# Patient Record
Sex: FEMALE | Race: BLACK OR AFRICAN AMERICAN | URBAN - METROPOLITAN AREA
[De-identification: names, ages, dates, MRNs, and addresses within clinical notes are randomized per-mention and may not be internally consistent; named-entity substitution may affect disease eponyms.]

---

## 2020-08-26 ENCOUNTER — TELEPHONE ENCOUNTER (OUTPATIENT)
Dept: URBAN - METROPOLITAN AREA CLINIC 54 | Facility: CLINIC | Age: 55
End: 2020-08-26

## 2020-09-21 ENCOUNTER — OFFICE VISIT (OUTPATIENT)
Dept: URBAN - METROPOLITAN AREA CLINIC 54 | Facility: CLINIC | Age: 55
End: 2020-09-21
Payer: MEDICARE

## 2020-09-21 DIAGNOSIS — R19.7 DIARRHEA: ICD-10-CM

## 2020-09-21 DIAGNOSIS — K21.0 REFLUX ESOPHAGITIS: ICD-10-CM

## 2020-09-21 DIAGNOSIS — K31.84 GASTROPARESIS: ICD-10-CM

## 2020-09-21 DIAGNOSIS — D12.6 SERRATED ADENOMA OF COLON: ICD-10-CM

## 2020-09-21 PROCEDURE — 99214 OFFICE O/P EST MOD 30 MIN: CPT | Performed by: INTERNAL MEDICINE

## 2020-09-21 RX ORDER — CLONIDINE HYDROCHLORIDE 0.2 MG/1
AS DIRECTED TABLET ORAL
Refills: 0 | Status: ACTIVE | COMMUNITY
Start: 1900-01-01

## 2020-09-21 RX ORDER — TOPIRAMATE 25 MG/1
1 TABLET TABLET, FILM COATED ORAL TWICE A DAY
Refills: 0 | Status: ACTIVE | COMMUNITY
Start: 1900-01-01

## 2020-09-21 RX ORDER — PREGABALIN 150 MG/1
1 CAPSULE CAPSULE ORAL TWICE A DAY
Refills: 0 | Status: ACTIVE | COMMUNITY
Start: 1900-01-01

## 2020-09-21 RX ORDER — ATORVASTATIN CALCIUM 80 MG/1
TABLET, FILM COATED ORAL
Qty: 0 | Refills: 0 | Status: ACTIVE | COMMUNITY
Start: 1900-01-01

## 2020-09-21 RX ORDER — OXYCODONE AND ACETAMINOPHEN 5; 325 MG/1; MG/1
1 TABLET AS NEEDED TABLET ORAL
Status: ACTIVE | COMMUNITY

## 2020-09-21 RX ORDER — FLUTICASONE PROPIONATE 50 UG/1
1 SPRAY IN EACH NOSTRIL SPRAY, METERED NASAL ONCE A DAY
Status: ACTIVE | COMMUNITY

## 2020-09-21 RX ORDER — AMLODIPINE BESYLATE 10 MG/1
TAKE 1 TABLET (10 MG) BY ORAL ROUTE ONCE DAILY TABLET ORAL 1
Qty: 0 | Refills: 0 | Status: ACTIVE | COMMUNITY
Start: 1900-01-01

## 2020-09-21 RX ORDER — BUTORPHANOL TARTRATE 10 MG/ML
SPRAY NASAL
Qty: 0 | Refills: 0 | Status: DISCONTINUED | COMMUNITY
Start: 1900-01-01

## 2020-09-21 RX ORDER — EZETIMIBE 10 MG/1
TAKE 1 TABLET (10 MG) BY ORAL ROUTE ONCE DAILY TABLET ORAL 1
Qty: 0 | Refills: 0 | Status: ACTIVE | COMMUNITY
Start: 1900-01-01

## 2020-09-21 RX ORDER — METHOTREXATE SODIUM 2.5 MG/1
AS DIRECTED TABLET ORAL
Refills: 0 | Status: ACTIVE | COMMUNITY
Start: 1900-01-01

## 2020-09-21 RX ORDER — DICLOFENAC SODIUM 10 MG/G
AS DIRECTED GEL TOPICAL
Status: ACTIVE | COMMUNITY

## 2020-09-21 RX ORDER — OMEPRAZOLE 20 MG/1
1 CAPSULE 30 MINUTES BEFORE MORNING MEAL CAPSULE, DELAYED RELEASE ORAL ONCE A DAY
Status: ACTIVE | COMMUNITY

## 2020-09-21 RX ORDER — LORATADINE 10 MG
1 TABLET TABLET ORAL ONCE A DAY
Status: ACTIVE | COMMUNITY

## 2020-09-21 RX ORDER — LINACLOTIDE 290 UG/1
TAKE ONE CAPSULE BY MOUTH ONCE DAILY CAPSULE, GELATIN COATED ORAL
Qty: 30 | Refills: 9 | Status: ACTIVE | COMMUNITY
Start: 2018-10-07

## 2020-09-21 RX ORDER — CHOLECALCIFEROL (VITAMIN D3) 1250 MCG
1 CAPSULE CAPSULE ORAL
Status: ACTIVE | COMMUNITY

## 2020-09-21 RX ORDER — TIZANIDINE 4 MG/1
1 TABLET AS NEEDED TABLET ORAL THREE TIMES A DAY
Status: ACTIVE | COMMUNITY

## 2020-09-21 RX ORDER — CELECOXIB 200 MG/1
1 CAPSULE WITH FOOD CAPSULE ORAL ONCE A DAY
Status: ACTIVE | COMMUNITY

## 2020-09-21 RX ORDER — SODIUM, POTASSIUM,MAG SULFATES 17.5-3.13G
354ML SOLUTION, RECONSTITUTED, ORAL ORAL
Qty: 354 MILLILITER | Refills: 0 | OUTPATIENT
Start: 2020-09-21 | End: 2020-09-22

## 2020-09-21 RX ORDER — FOLIC ACID 1 MG/1
TAKE 1 TABLET (1 MG) BY ORAL ROUTE ONCE DAILY TABLET ORAL 1
Qty: 0 | Refills: 0 | Status: ACTIVE | COMMUNITY
Start: 1900-01-01

## 2020-09-21 NOTE — HPI-TODAY'S VISIT:
Sessile polys  Gastroparesis  Pt report that she is having trouble with alternating diarrhea and abdominal pain.  Pt reports that the diarrhea has been present since Decm

## 2020-09-30 ENCOUNTER — OFFICE VISIT (OUTPATIENT)
Dept: URBAN - METROPOLITAN AREA SURGERY CENTER 14 | Facility: SURGERY CENTER | Age: 55
End: 2020-09-30
Payer: MEDICARE

## 2020-09-30 ENCOUNTER — CLAIMS CREATED FROM THE CLAIM WINDOW (OUTPATIENT)
Dept: URBAN - METROPOLITAN AREA CLINIC 4 | Facility: CLINIC | Age: 55
End: 2020-09-30
Payer: MEDICARE

## 2020-09-30 DIAGNOSIS — D12.5 BENIGN NEOPLASM OF SIGMOID COLON: ICD-10-CM

## 2020-09-30 DIAGNOSIS — K63.89 OTHER SPECIFIED DISEASES OF INTESTINE: ICD-10-CM

## 2020-09-30 DIAGNOSIS — K63.5 BENIGN COLON POLYP: ICD-10-CM

## 2020-09-30 DIAGNOSIS — R19.4 ALTERED BOWEL HABITS: ICD-10-CM

## 2020-09-30 PROCEDURE — 45380 COLONOSCOPY AND BIOPSY: CPT | Performed by: INTERNAL MEDICINE

## 2020-09-30 PROCEDURE — 88305 TISSUE EXAM BY PATHOLOGIST: CPT | Performed by: PATHOLOGY

## 2020-09-30 PROCEDURE — G9937 DIG OR SURV COLSCO: HCPCS | Performed by: INTERNAL MEDICINE

## 2020-09-30 PROCEDURE — G8907 PT DOC NO EVENTS ON DISCHARG: HCPCS | Performed by: INTERNAL MEDICINE

## 2020-09-30 PROCEDURE — 88313 SPECIAL STAINS GROUP 2: CPT | Performed by: PATHOLOGY

## 2020-09-30 PROCEDURE — 88342 IMHCHEM/IMCYTCHM 1ST ANTB: CPT | Performed by: PATHOLOGY

## 2020-09-30 RX ORDER — PREGABALIN 150 MG/1
1 CAPSULE CAPSULE ORAL TWICE A DAY
Refills: 0 | Status: ACTIVE | COMMUNITY
Start: 1900-01-01

## 2020-09-30 RX ORDER — DICLOFENAC SODIUM 10 MG/G
AS DIRECTED GEL TOPICAL
Status: ACTIVE | COMMUNITY

## 2020-09-30 RX ORDER — ATORVASTATIN CALCIUM 80 MG/1
TABLET, FILM COATED ORAL
Qty: 0 | Refills: 0 | Status: ACTIVE | COMMUNITY
Start: 1900-01-01

## 2020-09-30 RX ORDER — LINACLOTIDE 290 UG/1
TAKE ONE CAPSULE BY MOUTH ONCE DAILY CAPSULE, GELATIN COATED ORAL
Qty: 30 | Refills: 9 | Status: ACTIVE | COMMUNITY
Start: 2018-10-07

## 2020-09-30 RX ORDER — CHOLECALCIFEROL (VITAMIN D3) 1250 MCG
1 CAPSULE CAPSULE ORAL
Status: ACTIVE | COMMUNITY

## 2020-09-30 RX ORDER — OXYCODONE AND ACETAMINOPHEN 5; 325 MG/1; MG/1
1 TABLET AS NEEDED TABLET ORAL
Status: ACTIVE | COMMUNITY

## 2020-09-30 RX ORDER — CELECOXIB 200 MG/1
1 CAPSULE WITH FOOD CAPSULE ORAL ONCE A DAY
Status: ACTIVE | COMMUNITY

## 2020-09-30 RX ORDER — TIZANIDINE 4 MG/1
1 TABLET AS NEEDED TABLET ORAL THREE TIMES A DAY
Status: ACTIVE | COMMUNITY

## 2020-09-30 RX ORDER — OMEPRAZOLE 20 MG/1
1 CAPSULE 30 MINUTES BEFORE MORNING MEAL CAPSULE, DELAYED RELEASE ORAL ONCE A DAY
Status: ACTIVE | COMMUNITY

## 2020-09-30 RX ORDER — FLUTICASONE PROPIONATE 50 UG/1
1 SPRAY IN EACH NOSTRIL SPRAY, METERED NASAL ONCE A DAY
Status: ACTIVE | COMMUNITY

## 2020-09-30 RX ORDER — AMLODIPINE BESYLATE 10 MG/1
TAKE 1 TABLET (10 MG) BY ORAL ROUTE ONCE DAILY TABLET ORAL 1
Qty: 0 | Refills: 0 | Status: ACTIVE | COMMUNITY
Start: 1900-01-01

## 2020-09-30 RX ORDER — CLONIDINE HYDROCHLORIDE 0.2 MG/1
AS DIRECTED TABLET ORAL
Refills: 0 | Status: ACTIVE | COMMUNITY
Start: 1900-01-01

## 2020-09-30 RX ORDER — FOLIC ACID 1 MG/1
TAKE 1 TABLET (1 MG) BY ORAL ROUTE ONCE DAILY TABLET ORAL 1
Qty: 0 | Refills: 0 | Status: ACTIVE | COMMUNITY
Start: 1900-01-01

## 2020-09-30 RX ORDER — EZETIMIBE 10 MG/1
TAKE 1 TABLET (10 MG) BY ORAL ROUTE ONCE DAILY TABLET ORAL 1
Qty: 0 | Refills: 0 | Status: ACTIVE | COMMUNITY
Start: 1900-01-01

## 2020-09-30 RX ORDER — DICYCLOMINE HYDROCHLORIDE 20 MG/1
TAKE 1 TABLET BY MOUTH THREE TIMES DAILY AS NEEDED FOR 30 DAYS TABLET ORAL THREE TIMES A DAY
Qty: 90 | Refills: 2 | OUTPATIENT
Start: 2020-09-30 | End: 2021-06-27

## 2020-09-30 RX ORDER — METHOTREXATE SODIUM 2.5 MG/1
AS DIRECTED TABLET ORAL
Refills: 0 | Status: ACTIVE | COMMUNITY
Start: 1900-01-01

## 2020-09-30 RX ORDER — LORATADINE 10 MG
1 TABLET TABLET ORAL ONCE A DAY
Status: ACTIVE | COMMUNITY

## 2020-09-30 RX ORDER — TOPIRAMATE 25 MG/1
1 TABLET TABLET, FILM COATED ORAL TWICE A DAY
Refills: 0 | Status: ACTIVE | COMMUNITY
Start: 1900-01-01

## 2020-10-22 ENCOUNTER — TELEPHONE ENCOUNTER (OUTPATIENT)
Dept: URBAN - METROPOLITAN AREA CLINIC 54 | Facility: CLINIC | Age: 55
End: 2020-10-22

## 2020-11-23 ENCOUNTER — OFFICE VISIT (OUTPATIENT)
Dept: URBAN - METROPOLITAN AREA CLINIC 54 | Facility: CLINIC | Age: 55
End: 2020-11-23
Payer: MEDICARE

## 2020-11-23 DIAGNOSIS — K58.9 IBS (IRRITABLE BOWEL SYNDROME): ICD-10-CM

## 2020-11-23 DIAGNOSIS — D50.9 IDA (IRON DEFICIENCY ANEMIA): ICD-10-CM

## 2020-11-23 DIAGNOSIS — K31.84 GASTROPARESIS: ICD-10-CM

## 2020-11-23 PROCEDURE — G9903 PT SCRN TBCO ID AS NON USER: HCPCS | Performed by: INTERNAL MEDICINE

## 2020-11-23 PROCEDURE — G8417 CALC BMI ABV UP PARAM F/U: HCPCS | Performed by: INTERNAL MEDICINE

## 2020-11-23 PROCEDURE — G8482 FLU IMMUNIZE ORDER/ADMIN: HCPCS | Performed by: INTERNAL MEDICINE

## 2020-11-23 PROCEDURE — 1036F TOBACCO NON-USER: CPT | Performed by: INTERNAL MEDICINE

## 2020-11-23 PROCEDURE — 99214 OFFICE O/P EST MOD 30 MIN: CPT | Performed by: INTERNAL MEDICINE

## 2020-11-23 PROCEDURE — G8427 DOCREV CUR MEDS BY ELIG CLIN: HCPCS | Performed by: INTERNAL MEDICINE

## 2020-11-23 RX ORDER — LINACLOTIDE 290 UG/1
TAKE ONE CAPSULE BY MOUTH ONCE DAILY CAPSULE, GELATIN COATED ORAL
Qty: 30 | Refills: 9 | Status: ACTIVE | COMMUNITY
Start: 2018-10-07

## 2020-11-23 RX ORDER — CELECOXIB 200 MG/1
1 CAPSULE WITH FOOD CAPSULE ORAL ONCE A DAY
Status: ACTIVE | COMMUNITY

## 2020-11-23 RX ORDER — TIZANIDINE 4 MG/1
1 TABLET AS NEEDED TABLET ORAL THREE TIMES A DAY
Status: ACTIVE | COMMUNITY

## 2020-11-23 RX ORDER — OMEPRAZOLE 20 MG/1
1 CAPSULE 30 MINUTES BEFORE MORNING MEAL CAPSULE, DELAYED RELEASE ORAL ONCE A DAY
Status: ACTIVE | COMMUNITY

## 2020-11-23 RX ORDER — FOLIC ACID 1 MG/1
TAKE 1 TABLET (1 MG) BY ORAL ROUTE ONCE DAILY TABLET ORAL 1
Qty: 0 | Refills: 0 | Status: ACTIVE | COMMUNITY
Start: 1900-01-01

## 2020-11-23 RX ORDER — EZETIMIBE 10 MG/1
TAKE 1 TABLET (10 MG) BY ORAL ROUTE ONCE DAILY TABLET ORAL 1
Qty: 0 | Refills: 0 | Status: ACTIVE | COMMUNITY
Start: 1900-01-01

## 2020-11-23 RX ORDER — AMLODIPINE BESYLATE 10 MG/1
TAKE 1 TABLET (10 MG) BY ORAL ROUTE ONCE DAILY TABLET ORAL 1
Qty: 0 | Refills: 0 | Status: ACTIVE | COMMUNITY
Start: 1900-01-01

## 2020-11-23 RX ORDER — CLONIDINE HYDROCHLORIDE 0.2 MG/1
AS DIRECTED TABLET ORAL
Refills: 0 | Status: ACTIVE | COMMUNITY
Start: 1900-01-01

## 2020-11-23 RX ORDER — LORATADINE 10 MG
1 TABLET TABLET ORAL ONCE A DAY
Status: ACTIVE | COMMUNITY

## 2020-11-23 RX ORDER — OXYCODONE AND ACETAMINOPHEN 5; 325 MG/1; MG/1
1 TABLET AS NEEDED TABLET ORAL
Status: ACTIVE | COMMUNITY

## 2020-11-23 RX ORDER — METHOTREXATE SODIUM 2.5 MG/1
AS DIRECTED TABLET ORAL
Refills: 0 | Status: ACTIVE | COMMUNITY
Start: 1900-01-01

## 2020-11-23 RX ORDER — ATORVASTATIN CALCIUM 80 MG/1
TABLET, FILM COATED ORAL
Qty: 0 | Refills: 0 | Status: ACTIVE | COMMUNITY
Start: 1900-01-01

## 2020-11-23 RX ORDER — TOPIRAMATE 25 MG/1
1 TABLET TABLET, FILM COATED ORAL TWICE A DAY
Refills: 0 | Status: ACTIVE | COMMUNITY
Start: 1900-01-01

## 2020-11-23 RX ORDER — CHOLECALCIFEROL (VITAMIN D3) 1250 MCG
1 CAPSULE CAPSULE ORAL
Status: ACTIVE | COMMUNITY

## 2020-11-23 RX ORDER — PREGABALIN 150 MG/1
1 CAPSULE CAPSULE ORAL TWICE A DAY
Refills: 0 | Status: ACTIVE | COMMUNITY
Start: 1900-01-01

## 2020-11-23 RX ORDER — FLUTICASONE PROPIONATE 50 UG/1
1 SPRAY IN EACH NOSTRIL SPRAY, METERED NASAL ONCE A DAY
Status: ACTIVE | COMMUNITY

## 2020-11-23 RX ORDER — DICYCLOMINE HYDROCHLORIDE 20 MG/1
TAKE 1 TABLET BY MOUTH THREE TIMES DAILY AS NEEDED FOR 30 DAYS TABLET ORAL THREE TIMES A DAY
Qty: 90 | Refills: 2 | Status: ACTIVE | COMMUNITY
Start: 2020-09-30 | End: 2021-06-27

## 2020-11-23 RX ORDER — DICLOFENAC SODIUM 10 MG/G
AS DIRECTED GEL TOPICAL
Status: ACTIVE | COMMUNITY

## 2020-11-23 NOTE — HPI-OTHER HISTORIES
Here to review her C scope results. Had C scope with random colon bx with neg bx for microscopic colitis. Pt is currently on the FODMAP diet.  Pt reports that she had been advised that she has Sjogrens syndrome. Pt reports that she was advised that this could contribute to symptoms. Pt with hx of ankylosing spondylitis.  Pt reports that she was started on Humira and has been on injections every other week for this.  Pt reports that she is having approx q3 days a bowel movement to every day.  Pt with microcytic anemia mcv of 78 with hgb of 12

## 2020-11-30 ENCOUNTER — LAB OUTSIDE AN ENCOUNTER (OUTPATIENT)
Dept: URBAN - METROPOLITAN AREA CLINIC 54 | Facility: CLINIC | Age: 55
End: 2020-11-30

## 2020-12-30 ENCOUNTER — OFFICE VISIT (OUTPATIENT)
Dept: URBAN - METROPOLITAN AREA SURGERY CENTER 14 | Facility: SURGERY CENTER | Age: 55
End: 2020-12-30
Payer: MEDICARE

## 2020-12-30 ENCOUNTER — CLAIMS CREATED FROM THE CLAIM WINDOW (OUTPATIENT)
Dept: URBAN - METROPOLITAN AREA CLINIC 4 | Facility: CLINIC | Age: 55
End: 2020-12-30
Payer: MEDICARE

## 2020-12-30 DIAGNOSIS — K31.89 ACQUIRED DEFORMITY OF DUODENUM: ICD-10-CM

## 2020-12-30 DIAGNOSIS — K31.89 OTHER DISEASES OF STOMACH AND DUODENUM: ICD-10-CM

## 2020-12-30 DIAGNOSIS — K22.8 COLUMNAR-LINED ESOPHAGUS: ICD-10-CM

## 2020-12-30 DIAGNOSIS — D50.9 ANEMIA, IRON DEFICIENCY: ICD-10-CM

## 2020-12-30 PROCEDURE — 43239 EGD BIOPSY SINGLE/MULTIPLE: CPT | Performed by: INTERNAL MEDICINE

## 2020-12-30 PROCEDURE — 88312 SPECIAL STAINS GROUP 1: CPT | Performed by: PATHOLOGY

## 2020-12-30 PROCEDURE — 88305 TISSUE EXAM BY PATHOLOGIST: CPT | Performed by: PATHOLOGY

## 2020-12-30 PROCEDURE — G8907 PT DOC NO EVENTS ON DISCHARG: HCPCS | Performed by: INTERNAL MEDICINE

## 2020-12-30 RX ORDER — CELECOXIB 200 MG/1
1 CAPSULE WITH FOOD CAPSULE ORAL ONCE A DAY
Status: ACTIVE | COMMUNITY

## 2020-12-30 RX ORDER — TIZANIDINE 4 MG/1
1 TABLET AS NEEDED TABLET ORAL THREE TIMES A DAY
Status: ACTIVE | COMMUNITY

## 2020-12-30 RX ORDER — TOPIRAMATE 25 MG/1
1 TABLET TABLET, FILM COATED ORAL TWICE A DAY
Refills: 0 | Status: ACTIVE | COMMUNITY
Start: 1900-01-01

## 2020-12-30 RX ORDER — AMLODIPINE BESYLATE 10 MG/1
TAKE 1 TABLET (10 MG) BY ORAL ROUTE ONCE DAILY TABLET ORAL 1
Qty: 0 | Refills: 0 | Status: ACTIVE | COMMUNITY
Start: 1900-01-01

## 2020-12-30 RX ORDER — EZETIMIBE 10 MG/1
TAKE 1 TABLET (10 MG) BY ORAL ROUTE ONCE DAILY TABLET ORAL 1
Qty: 0 | Refills: 0 | Status: ACTIVE | COMMUNITY
Start: 1900-01-01

## 2020-12-30 RX ORDER — DICYCLOMINE HYDROCHLORIDE 20 MG/1
TAKE 1 TABLET BY MOUTH THREE TIMES DAILY AS NEEDED FOR 30 DAYS TABLET ORAL THREE TIMES A DAY
Qty: 90 | Refills: 2 | Status: ACTIVE | COMMUNITY
Start: 2020-09-30 | End: 2021-06-27

## 2020-12-30 RX ORDER — CHOLECALCIFEROL (VITAMIN D3) 1250 MCG
1 CAPSULE CAPSULE ORAL
Status: ACTIVE | COMMUNITY

## 2020-12-30 RX ORDER — ATORVASTATIN CALCIUM 80 MG/1
TABLET, FILM COATED ORAL
Qty: 0 | Refills: 0 | Status: ACTIVE | COMMUNITY
Start: 1900-01-01

## 2020-12-30 RX ORDER — LINACLOTIDE 290 UG/1
TAKE ONE CAPSULE BY MOUTH ONCE DAILY CAPSULE, GELATIN COATED ORAL
Qty: 30 | Refills: 9 | Status: ACTIVE | COMMUNITY
Start: 2018-10-07

## 2020-12-30 RX ORDER — CLONIDINE HYDROCHLORIDE 0.2 MG/1
AS DIRECTED TABLET ORAL
Refills: 0 | Status: ACTIVE | COMMUNITY
Start: 1900-01-01

## 2020-12-30 RX ORDER — PREGABALIN 150 MG/1
1 CAPSULE CAPSULE ORAL TWICE A DAY
Refills: 0 | Status: ACTIVE | COMMUNITY
Start: 1900-01-01

## 2020-12-30 RX ORDER — OMEPRAZOLE 20 MG/1
1 CAPSULE 30 MINUTES BEFORE MORNING MEAL CAPSULE, DELAYED RELEASE ORAL ONCE A DAY
Status: ACTIVE | COMMUNITY

## 2020-12-30 RX ORDER — OXYCODONE AND ACETAMINOPHEN 5; 325 MG/1; MG/1
1 TABLET AS NEEDED TABLET ORAL
Status: ACTIVE | COMMUNITY

## 2020-12-30 RX ORDER — FLUTICASONE PROPIONATE 50 UG/1
1 SPRAY IN EACH NOSTRIL SPRAY, METERED NASAL ONCE A DAY
Status: ACTIVE | COMMUNITY

## 2020-12-30 RX ORDER — LORATADINE 10 MG
1 TABLET TABLET ORAL ONCE A DAY
Status: ACTIVE | COMMUNITY

## 2020-12-30 RX ORDER — METHOTREXATE SODIUM 2.5 MG/1
AS DIRECTED TABLET ORAL
Refills: 0 | Status: ACTIVE | COMMUNITY
Start: 1900-01-01

## 2020-12-30 RX ORDER — FOLIC ACID 1 MG/1
TAKE 1 TABLET (1 MG) BY ORAL ROUTE ONCE DAILY TABLET ORAL 1
Qty: 0 | Refills: 0 | Status: ACTIVE | COMMUNITY
Start: 1900-01-01

## 2020-12-30 RX ORDER — DICLOFENAC SODIUM 10 MG/G
AS DIRECTED GEL TOPICAL
Status: ACTIVE | COMMUNITY

## 2022-01-25 ENCOUNTER — WEB ENCOUNTER (OUTPATIENT)
Dept: URBAN - NONMETROPOLITAN AREA CLINIC 4 | Facility: CLINIC | Age: 57
End: 2022-01-25

## 2022-01-25 ENCOUNTER — OFFICE VISIT (OUTPATIENT)
Dept: URBAN - NONMETROPOLITAN AREA CLINIC 4 | Facility: CLINIC | Age: 57
End: 2022-01-25
Payer: MEDICARE

## 2022-01-25 VITALS
TEMPERATURE: 97.7 F | WEIGHT: 180.2 LBS | SYSTOLIC BLOOD PRESSURE: 146 MMHG | HEART RATE: 87 BPM | HEIGHT: 63 IN | BODY MASS INDEX: 31.93 KG/M2 | DIASTOLIC BLOOD PRESSURE: 98 MMHG

## 2022-01-25 DIAGNOSIS — D12.6 SERRATED ADENOMA OF COLON: ICD-10-CM

## 2022-01-25 DIAGNOSIS — K58.9 IBS (IRRITABLE BOWEL SYNDROME): ICD-10-CM

## 2022-01-25 PROCEDURE — 99213 OFFICE O/P EST LOW 20 MIN: CPT | Performed by: INTERNAL MEDICINE

## 2022-01-25 RX ORDER — FOLIC ACID 1 MG/1
TAKE 1 TABLET (1 MG) BY ORAL ROUTE ONCE DAILY TABLET ORAL 1
Qty: 0 | Refills: 0 | Status: ACTIVE | COMMUNITY
Start: 1900-01-01

## 2022-01-25 RX ORDER — CLONIDINE HYDROCHLORIDE 0.2 MG/1
AS DIRECTED TABLET ORAL
Refills: 0 | Status: ACTIVE | COMMUNITY
Start: 1900-01-01

## 2022-01-25 RX ORDER — DICLOFENAC SODIUM 10 MG/G
AS DIRECTED GEL TOPICAL
Status: ACTIVE | COMMUNITY

## 2022-01-25 RX ORDER — CELECOXIB 200 MG/1
1 CAPSULE WITH FOOD CAPSULE ORAL ONCE A DAY
Status: ACTIVE | COMMUNITY

## 2022-01-25 RX ORDER — AMLODIPINE BESYLATE 10 MG/1
TAKE 1 TABLET (10 MG) BY ORAL ROUTE ONCE DAILY TABLET ORAL 1
Qty: 0 | Refills: 0 | Status: ACTIVE | COMMUNITY
Start: 1900-01-01

## 2022-01-25 RX ORDER — LINACLOTIDE 290 UG/1
TAKE ONE CAPSULE BY MOUTH ONCE DAILY CAPSULE, GELATIN COATED ORAL
Qty: 90 | Refills: 2

## 2022-01-25 RX ORDER — PREGABALIN 150 MG/1
1 CAPSULE CAPSULE ORAL TWICE A DAY
Refills: 0 | Status: ACTIVE | COMMUNITY
Start: 1900-01-01

## 2022-01-25 RX ORDER — FLUTICASONE PROPIONATE 50 UG/1
1 SPRAY IN EACH NOSTRIL SPRAY, METERED NASAL ONCE A DAY
Status: ACTIVE | COMMUNITY

## 2022-01-25 RX ORDER — TIZANIDINE 4 MG/1
1 TABLET AS NEEDED TABLET ORAL THREE TIMES A DAY
Status: ACTIVE | COMMUNITY

## 2022-01-25 RX ORDER — EZETIMIBE 10 MG/1
TAKE 1 TABLET (10 MG) BY ORAL ROUTE ONCE DAILY TABLET ORAL 1
Qty: 0 | Refills: 0 | Status: ACTIVE | COMMUNITY
Start: 1900-01-01

## 2022-01-25 RX ORDER — LINACLOTIDE 290 UG/1
TAKE ONE CAPSULE BY MOUTH ONCE DAILY CAPSULE, GELATIN COATED ORAL
Qty: 30 | Refills: 9 | Status: ACTIVE | COMMUNITY
Start: 2018-10-07

## 2022-01-25 RX ORDER — OMEPRAZOLE 20 MG/1
1 CAPSULE 30 MINUTES BEFORE MORNING MEAL CAPSULE, DELAYED RELEASE ORAL ONCE A DAY
Status: ACTIVE | COMMUNITY

## 2022-01-25 RX ORDER — TOPIRAMATE 25 MG/1
1 TABLET TABLET, FILM COATED ORAL TWICE A DAY
Refills: 0 | Status: ACTIVE | COMMUNITY
Start: 1900-01-01

## 2022-01-25 RX ORDER — ATORVASTATIN CALCIUM 80 MG/1
TABLET, FILM COATED ORAL
Qty: 0 | Refills: 0 | Status: ACTIVE | COMMUNITY
Start: 1900-01-01

## 2022-01-25 RX ORDER — OXYCODONE AND ACETAMINOPHEN 5; 325 MG/1; MG/1
1 TABLET AS NEEDED TABLET ORAL
Status: ACTIVE | COMMUNITY

## 2022-01-25 RX ORDER — CHOLECALCIFEROL (VITAMIN D3) 1250 MCG
1 CAPSULE CAPSULE ORAL
Status: ACTIVE | COMMUNITY

## 2022-01-25 RX ORDER — METHOTREXATE SODIUM 2.5 MG/1
AS DIRECTED TABLET ORAL
Refills: 0 | Status: ACTIVE | COMMUNITY
Start: 1900-01-01

## 2022-01-25 RX ORDER — LORATADINE 10 MG
1 TABLET TABLET ORAL ONCE A DAY
Status: ACTIVE | COMMUNITY

## 2022-01-25 NOTE — HPI-TODAY'S VISIT:
Patient reports that she was out of refills for her linzess.  Pt reports that she has good response to Linzess.     Pt reports that she has otherwise been in her usual state of health. Has to do a stress test on next Friday.   Pt report that she has had surgery to get the scar tissue out of eyes.   Pt reports that she has had improvement in her anemia

## 2022-11-14 ENCOUNTER — TELEPHONE ENCOUNTER (OUTPATIENT)
Dept: URBAN - NONMETROPOLITAN AREA CLINIC 4 | Facility: CLINIC | Age: 57
End: 2022-11-14

## 2022-12-13 ENCOUNTER — TELEPHONE ENCOUNTER (OUTPATIENT)
Dept: URBAN - NONMETROPOLITAN AREA CLINIC 4 | Facility: CLINIC | Age: 57
End: 2022-12-13

## 2022-12-15 ENCOUNTER — OFFICE VISIT (OUTPATIENT)
Dept: URBAN - NONMETROPOLITAN AREA CLINIC 4 | Facility: CLINIC | Age: 57
End: 2022-12-15
Payer: MEDICARE

## 2022-12-15 ENCOUNTER — LAB OUTSIDE AN ENCOUNTER (OUTPATIENT)
Dept: URBAN - NONMETROPOLITAN AREA CLINIC 4 | Facility: CLINIC | Age: 57
End: 2022-12-15

## 2022-12-15 VITALS
BODY MASS INDEX: 31.54 KG/M2 | HEART RATE: 84 BPM | HEIGHT: 63 IN | SYSTOLIC BLOOD PRESSURE: 146 MMHG | TEMPERATURE: 97.9 F | DIASTOLIC BLOOD PRESSURE: 88 MMHG | WEIGHT: 178 LBS

## 2022-12-15 DIAGNOSIS — R19.7 DIARRHEA: ICD-10-CM

## 2022-12-15 DIAGNOSIS — K58.9 IBS (IRRITABLE BOWEL SYNDROME): ICD-10-CM

## 2022-12-15 DIAGNOSIS — K31.84 GASTROPARESIS: ICD-10-CM

## 2022-12-15 DIAGNOSIS — R13.19 ESOPHAGEAL DYSPHAGIA: ICD-10-CM

## 2022-12-15 PROBLEM — 10743008 IRRITABLE BOWEL SYNDROME: Status: ACTIVE | Noted: 2020-11-23

## 2022-12-15 PROBLEM — 62315008 DIARRHEA: Status: ACTIVE | Noted: 2022-12-15

## 2022-12-15 PROCEDURE — 99215 OFFICE O/P EST HI 40 MIN: CPT | Performed by: PHYSICIAN ASSISTANT

## 2022-12-15 RX ORDER — METHOTREXATE SODIUM 2.5 MG/1
AS DIRECTED TABLET ORAL
Refills: 0 | Status: ACTIVE | COMMUNITY
Start: 1900-01-01

## 2022-12-15 RX ORDER — PREGABALIN 150 MG/1
1 CAPSULE CAPSULE ORAL TWICE A DAY
Refills: 0 | Status: ACTIVE | COMMUNITY
Start: 1900-01-01

## 2022-12-15 RX ORDER — LINACLOTIDE 290 UG/1
TAKE ONE CAPSULE BY MOUTH ONCE DAILY CAPSULE, GELATIN COATED ORAL
Qty: 90 | Refills: 2 | Status: ACTIVE | COMMUNITY

## 2022-12-15 RX ORDER — CHOLECALCIFEROL (VITAMIN D3) 1250 MCG
1 CAPSULE CAPSULE ORAL
Status: ACTIVE | COMMUNITY

## 2022-12-15 RX ORDER — TOPIRAMATE 25 MG/1
1 TABLET TABLET, FILM COATED ORAL TWICE A DAY
Refills: 0 | Status: ACTIVE | COMMUNITY
Start: 1900-01-01

## 2022-12-15 RX ORDER — AMLODIPINE BESYLATE 10 MG/1
TAKE 1 TABLET (10 MG) BY ORAL ROUTE ONCE DAILY TABLET ORAL 1
Qty: 0 | Refills: 0 | Status: ACTIVE | COMMUNITY
Start: 1900-01-01

## 2022-12-15 RX ORDER — FOLIC ACID 1 MG/1
TAKE 1 TABLET (1 MG) BY ORAL ROUTE ONCE DAILY TABLET ORAL 1
Qty: 0 | Refills: 0 | Status: ACTIVE | COMMUNITY
Start: 1900-01-01

## 2022-12-15 RX ORDER — OXYCODONE AND ACETAMINOPHEN 5; 325 MG/1; MG/1
1 TABLET AS NEEDED TABLET ORAL
Status: DISCONTINUED | COMMUNITY

## 2022-12-15 RX ORDER — FLUTICASONE PROPIONATE 50 UG/1
1 SPRAY IN EACH NOSTRIL SPRAY, METERED NASAL ONCE A DAY
Status: ACTIVE | COMMUNITY

## 2022-12-15 RX ORDER — CELECOXIB 200 MG/1
1 CAPSULE WITH FOOD CAPSULE ORAL ONCE A DAY
Status: DISCONTINUED | COMMUNITY

## 2022-12-15 RX ORDER — ATORVASTATIN CALCIUM 80 MG/1
TABLET, FILM COATED ORAL
Qty: 0 | Refills: 0 | Status: ACTIVE | COMMUNITY
Start: 1900-01-01

## 2022-12-15 RX ORDER — EZETIMIBE 10 MG/1
TAKE 1 TABLET (10 MG) BY ORAL ROUTE ONCE DAILY TABLET ORAL 1
Qty: 0 | Refills: 0 | Status: DISCONTINUED | COMMUNITY
Start: 1900-01-01

## 2022-12-15 RX ORDER — LORATADINE 10 MG
1 TABLET TABLET ORAL ONCE A DAY
Status: ACTIVE | COMMUNITY

## 2022-12-15 RX ORDER — HYOSCYAMINE SULFATE 0.12 MG/1
1 TABLET AS NEEDED TABLET ORAL
Qty: 120 TABLET | Refills: 3 | OUTPATIENT
Start: 2022-12-15 | End: 2023-04-14

## 2022-12-15 RX ORDER — RIMEGEPANT SULFATE 75 MG/75MG
1 TABLET ON THE TONGUE AND ALLOW TO DISSOLVE TABLET, ORALLY DISINTEGRATING ORAL
Status: ACTIVE | COMMUNITY

## 2022-12-15 RX ORDER — HYDROCODONE BITARTRATE AND ACETAMINOPHEN 5; 325 MG/1; MG/1
1 TABLET AS NEEDED TABLET ORAL
Status: ACTIVE | COMMUNITY

## 2022-12-15 RX ORDER — CLONIDINE HYDROCHLORIDE 0.2 MG/1
AS DIRECTED TABLET ORAL
Refills: 0 | Status: ACTIVE | COMMUNITY
Start: 1900-01-01

## 2022-12-15 RX ORDER — OMEPRAZOLE 20 MG/1
1 CAPSULE 30 MINUTES BEFORE MORNING MEAL CAPSULE, DELAYED RELEASE ORAL ONCE A DAY
Status: ACTIVE | COMMUNITY

## 2022-12-15 RX ORDER — DICLOFENAC SODIUM 10 MG/G
AS DIRECTED GEL TOPICAL
Status: ACTIVE | COMMUNITY

## 2022-12-15 RX ORDER — TIZANIDINE 4 MG/1
1 TABLET AS NEEDED TABLET ORAL THREE TIMES A DAY
Status: ACTIVE | COMMUNITY

## 2022-12-15 NOTE — PHYSICAL EXAM GASTROINTESTINAL
Abdomen , soft, generalized abdominal tenderness to palpation, nondistended , no guarding or rigidity , no masses palpable ,

## 2022-12-15 NOTE — HPI-OTHER HISTORIES
01/2022 Patient reports that she was out of refills for her linzess.  Pt reports that she has good response to Linzess.   Pt reports that she has otherwise been in her usual state of health. Has to do a stress test on next Friday.   Pt report that she has had surgery to get the scar tissue out of eyes. Pt reports that she has had improvement in her anemia 11/2020 Here to review her C scope results. Had C scope with random colon bx with neg bx for microscopic colitis. Pt is currently on the FODMAP diet.  Pt reports that she had been advised that she has Sjogrens syndrome. Pt reports that she was advised that this could contribute to symptoms. Pt with hx of ankylosing spondylitis.  Pt reports that she was started on Humira and has been on injections every other week for this.  Pt reports that she is having approx q3 days a bowel movement to every day.  Pt with microcytic anemia mcv of 78 with hgb of 12

## 2022-12-15 NOTE — HPI-TODAY'S VISIT:
Here for hospital follow-up at [facility] seen in the ER no admission.  Patient seen at that time for reportedly chest pain, abdominal pain, and diarrhea.  Chest pain work-up noted be negative.  No abdominal imaging or stool studies performed.  Patient here today with multiple complaints including upper abdominal pain more epigastric and left upper quadrant as well as occasional nausea and vomiting that she feels may be related to her gastroparesis.  Patient with intermittent diarrhea over the last 2 years.  Patient with stool studies and EGD and colonoscopy without abnormalities noted.  Does not wish to take over-the-counter antidiarrheal medications. Also complains of dysphagia to solids and liquids.  Patient with nonobstructing Schatzki's ring on last EGD In 2020.  History of Sjogren's.

## 2023-01-06 ENCOUNTER — TELEPHONE ENCOUNTER (OUTPATIENT)
Dept: URBAN - NONMETROPOLITAN AREA CLINIC 4 | Facility: CLINIC | Age: 58
End: 2023-01-06

## 2023-01-24 ENCOUNTER — OFFICE VISIT (OUTPATIENT)
Dept: URBAN - NONMETROPOLITAN AREA CLINIC 4 | Facility: CLINIC | Age: 58
End: 2023-01-24

## 2023-01-26 ENCOUNTER — OFFICE VISIT (OUTPATIENT)
Dept: URBAN - NONMETROPOLITAN AREA CLINIC 4 | Facility: CLINIC | Age: 58
End: 2023-01-26

## 2023-01-26 RX ORDER — METHOTREXATE SODIUM 2.5 MG/1
AS DIRECTED TABLET ORAL
Refills: 0 | Status: ACTIVE | COMMUNITY
Start: 1900-01-01

## 2023-01-26 RX ORDER — LINACLOTIDE 290 UG/1
TAKE ONE CAPSULE BY MOUTH ONCE DAILY CAPSULE, GELATIN COATED ORAL
Qty: 90 | Refills: 2 | Status: ACTIVE | COMMUNITY

## 2023-01-26 RX ORDER — FOLIC ACID 1 MG/1
TAKE 1 TABLET (1 MG) BY ORAL ROUTE ONCE DAILY TABLET ORAL 1
Qty: 0 | Refills: 0 | Status: ACTIVE | COMMUNITY
Start: 1900-01-01

## 2023-01-26 RX ORDER — AMLODIPINE BESYLATE 10 MG/1
TAKE 1 TABLET (10 MG) BY ORAL ROUTE ONCE DAILY TABLET ORAL 1
Qty: 0 | Refills: 0 | Status: ACTIVE | COMMUNITY
Start: 1900-01-01

## 2023-01-26 RX ORDER — DICLOFENAC SODIUM 10 MG/G
AS DIRECTED GEL TOPICAL
Status: ACTIVE | COMMUNITY

## 2023-01-26 RX ORDER — RIMEGEPANT SULFATE 75 MG/75MG
1 TABLET ON THE TONGUE AND ALLOW TO DISSOLVE TABLET, ORALLY DISINTEGRATING ORAL
Status: ACTIVE | COMMUNITY

## 2023-01-26 RX ORDER — TOPIRAMATE 25 MG/1
1 TABLET TABLET, FILM COATED ORAL TWICE A DAY
Refills: 0 | Status: ACTIVE | COMMUNITY
Start: 1900-01-01

## 2023-01-26 RX ORDER — HYDROCODONE BITARTRATE AND ACETAMINOPHEN 5; 325 MG/1; MG/1
1 TABLET AS NEEDED TABLET ORAL
Status: ACTIVE | COMMUNITY

## 2023-01-26 RX ORDER — CLONIDINE HYDROCHLORIDE 0.2 MG/1
AS DIRECTED TABLET ORAL
Refills: 0 | Status: ACTIVE | COMMUNITY
Start: 1900-01-01

## 2023-01-26 RX ORDER — OMEPRAZOLE 20 MG/1
1 CAPSULE 30 MINUTES BEFORE MORNING MEAL CAPSULE, DELAYED RELEASE ORAL ONCE A DAY
Status: ACTIVE | COMMUNITY

## 2023-01-26 RX ORDER — HYOSCYAMINE SULFATE 0.12 MG/1
1 TABLET AS NEEDED TABLET ORAL
Qty: 120 TABLET | Refills: 3 | Status: ACTIVE | COMMUNITY
Start: 2022-12-15 | End: 2023-04-14

## 2023-01-26 RX ORDER — FLUTICASONE PROPIONATE 50 UG/1
1 SPRAY IN EACH NOSTRIL SPRAY, METERED NASAL ONCE A DAY
Status: ACTIVE | COMMUNITY

## 2023-01-26 RX ORDER — ATORVASTATIN CALCIUM 80 MG/1
TABLET, FILM COATED ORAL
Qty: 0 | Refills: 0 | Status: ACTIVE | COMMUNITY
Start: 1900-01-01

## 2023-01-26 RX ORDER — LORATADINE 10 MG
1 TABLET TABLET ORAL ONCE A DAY
Status: ACTIVE | COMMUNITY

## 2023-01-26 RX ORDER — TIZANIDINE 4 MG/1
1 TABLET AS NEEDED TABLET ORAL THREE TIMES A DAY
Status: ACTIVE | COMMUNITY

## 2023-01-26 RX ORDER — PREGABALIN 150 MG/1
1 CAPSULE CAPSULE ORAL TWICE A DAY
Refills: 0 | Status: ACTIVE | COMMUNITY
Start: 1900-01-01

## 2023-01-26 RX ORDER — CHOLECALCIFEROL (VITAMIN D3) 1250 MCG
1 CAPSULE CAPSULE ORAL
Status: ACTIVE | COMMUNITY

## 2023-03-30 ENCOUNTER — OFFICE VISIT (OUTPATIENT)
Dept: URBAN - NONMETROPOLITAN AREA CLINIC 4 | Facility: CLINIC | Age: 58
End: 2023-03-30
Payer: MEDICARE

## 2023-03-30 ENCOUNTER — LAB OUTSIDE AN ENCOUNTER (OUTPATIENT)
Dept: URBAN - NONMETROPOLITAN AREA CLINIC 4 | Facility: CLINIC | Age: 58
End: 2023-03-30

## 2023-03-30 ENCOUNTER — DASHBOARD ENCOUNTERS (OUTPATIENT)
Age: 58
End: 2023-03-30

## 2023-03-30 VITALS
DIASTOLIC BLOOD PRESSURE: 67 MMHG | BODY MASS INDEX: 30.12 KG/M2 | SYSTOLIC BLOOD PRESSURE: 111 MMHG | WEIGHT: 170 LBS | HEIGHT: 63 IN | HEART RATE: 75 BPM | TEMPERATURE: 98.6 F

## 2023-03-30 DIAGNOSIS — D12.6 SERRATED ADENOMA OF COLON: ICD-10-CM

## 2023-03-30 DIAGNOSIS — K58.9 IBS (IRRITABLE BOWEL SYNDROME): ICD-10-CM

## 2023-03-30 DIAGNOSIS — K22.2 SCHATZKI'S RING: ICD-10-CM

## 2023-03-30 DIAGNOSIS — R13.19 ESOPHAGEAL DYSPHAGIA: ICD-10-CM

## 2023-03-30 DIAGNOSIS — K31.84 GASTROPARESIS: ICD-10-CM

## 2023-03-30 DIAGNOSIS — M35.08 SJOGREN SYNDROME WITH GASTROINTESTINAL INVOLVEMENT: ICD-10-CM

## 2023-03-30 PROBLEM — 235623002: Status: ACTIVE | Noted: 2023-03-30

## 2023-03-30 PROCEDURE — 99214 OFFICE O/P EST MOD 30 MIN: CPT | Performed by: INTERNAL MEDICINE

## 2023-03-30 RX ORDER — ATORVASTATIN CALCIUM 80 MG/1
TABLET, FILM COATED ORAL
Qty: 0 | Refills: 0 | Status: ACTIVE | COMMUNITY
Start: 1900-01-01

## 2023-03-30 RX ORDER — CLONIDINE HYDROCHLORIDE 0.2 MG/1
AS DIRECTED TABLET ORAL
Refills: 0 | Status: ACTIVE | COMMUNITY
Start: 1900-01-01

## 2023-03-30 RX ORDER — FLUTICASONE PROPIONATE 50 UG/1
1 SPRAY IN EACH NOSTRIL SPRAY, METERED NASAL ONCE A DAY
Status: ACTIVE | COMMUNITY

## 2023-03-30 RX ORDER — OMEPRAZOLE 20 MG/1
1 CAPSULE 30 MINUTES BEFORE MORNING MEAL CAPSULE, DELAYED RELEASE ORAL ONCE A DAY
Status: ACTIVE | COMMUNITY

## 2023-03-30 RX ORDER — LINACLOTIDE 290 UG/1
TAKE ONE CAPSULE BY MOUTH ONCE DAILY CAPSULE, GELATIN COATED ORAL
Qty: 90 | Refills: 2 | Status: ACTIVE | COMMUNITY

## 2023-03-30 RX ORDER — TOPIRAMATE 25 MG/1
1 TABLET TABLET, FILM COATED ORAL TWICE A DAY
Refills: 0 | Status: ACTIVE | COMMUNITY
Start: 1900-01-01

## 2023-03-30 RX ORDER — RIMEGEPANT SULFATE 75 MG/75MG
1 TABLET ON THE TONGUE AND ALLOW TO DISSOLVE TABLET, ORALLY DISINTEGRATING ORAL
Status: ACTIVE | COMMUNITY

## 2023-03-30 RX ORDER — LORATADINE 10 MG
1 TABLET TABLET ORAL ONCE A DAY
Status: ACTIVE | COMMUNITY

## 2023-03-30 RX ORDER — HYDROCODONE BITARTRATE AND ACETAMINOPHEN 5; 325 MG/1; MG/1
1 TABLET AS NEEDED TABLET ORAL
Status: ACTIVE | COMMUNITY

## 2023-03-30 RX ORDER — HYOSCYAMINE SULFATE 0.12 MG/1
1 TABLET AS NEEDED TABLET ORAL
Qty: 120 TABLET | Refills: 3 | Status: ACTIVE | COMMUNITY
Start: 2022-12-15 | End: 2023-04-14

## 2023-03-30 RX ORDER — CHOLECALCIFEROL (VITAMIN D3) 1250 MCG
1 CAPSULE CAPSULE ORAL
Status: ACTIVE | COMMUNITY

## 2023-03-30 RX ORDER — METHOTREXATE SODIUM 2.5 MG/1
AS DIRECTED TABLET ORAL
Refills: 0 | Status: ACTIVE | COMMUNITY
Start: 1900-01-01

## 2023-03-30 RX ORDER — FOLIC ACID 1 MG/1
TAKE 1 TABLET (1 MG) BY ORAL ROUTE ONCE DAILY TABLET ORAL 1
Qty: 0 | Refills: 0 | Status: ACTIVE | COMMUNITY
Start: 1900-01-01

## 2023-03-30 RX ORDER — PREGABALIN 150 MG/1
1 CAPSULE CAPSULE ORAL TWICE A DAY
Refills: 0 | Status: ACTIVE | COMMUNITY
Start: 1900-01-01

## 2023-03-30 RX ORDER — DICLOFENAC SODIUM 10 MG/G
AS DIRECTED GEL TOPICAL
Status: ACTIVE | COMMUNITY

## 2023-03-30 RX ORDER — TIZANIDINE 4 MG/1
1 TABLET AS NEEDED TABLET ORAL THREE TIMES A DAY
Status: ACTIVE | COMMUNITY

## 2023-03-30 RX ORDER — AMLODIPINE BESYLATE 10 MG/1
TAKE 1 TABLET (10 MG) BY ORAL ROUTE ONCE DAILY TABLET ORAL 1
Qty: 0 | Refills: 0 | Status: ACTIVE | COMMUNITY
Start: 1900-01-01

## 2023-03-30 NOTE — HPI-OTHER HISTORIES
01/2022 Patient reports that she was out of refills for her linzess.  Pt reports that she has good response to Linzess.   Pt reports that she has otherwise been in her usual state of health. Has to do a stress test on next Friday.   Pt report that she has had surgery to get the scar tissue out of eyes. Pt reports that she has had improvement in her anemia 11/2020 Here to review her C scope results. Had C scope with random colon bx with neg bx for microscopic colitis. Pt is currently on the FODMAP diet.  Pt reports that she had been advised that she has Sjogrens syndrome. Pt reports that she was advised that this could contribute to symptoms. Pt with hx of ankylosing spondylitis.  Pt reports that she was started on Humira and has been on injections every other week for this.  Pt reports that she is having approx q3 days a bowel movement to every day.  Pt with microcytic anemia mcv of 78 with hgb of 12  3-30-23 Pt with diarrhea intermittently unable to correlate with what she eats.  Pt reports that she just allows it to happen with no intervention.   Pt reports that she dno longer has iron def anemia.   Pt reports that she has a hematologist which has been following. Had when she was younger had sickle cell trait.  Pt reports that her Sjogrens, has arthritis.   Takes no medication.

## 2023-03-30 NOTE — HPI-TODAY'S VISIT:
Here for hospital follow-up at [facility] seen in the ER no admission.  Patient seen at that time for reportedly chest pain, abdominal pain, and diarrhea.  Chest pain work-up noted be negative.  No abdominal imaging or stool studies performed.  Patient here today with multiple complaints including upper abdominal pain more epigastric and left upper quadrant as well as occasional nausea and vomiting that she feels may be related to her gastroparesis.  Patient with intermittent diarrhea over the last 2 years.  Patient with stool studies and EGD and colonoscopy without abnormalities noted.  Does not wish to take over-the-counter antidiarrheal medications. Also complains of dysphagia to solids and liquids.  Patient with nonobstructing Schatzki's ring on last EGD In 2020.  History of Sjogren's.

## 2023-03-30 NOTE — PHYSICAL EXAM EYES:
"SUBJECTIVE:   Isabel is a 66 year old who presents for Preventive Visit.  Patient has been advised of split billing requirements and indicates understanding: Yes  Are you in the first 12 months of your Medicare coverage?  Last eye exam 2/22 and has one scheduled on 2/21/23    Healthy Habits:     In general, how would you rate your overall health?  Excellent    Frequency of exercise:  None    Duration of exercise:  Less than 15 minutes    Do you usually eat at least 4 servings of fruit and vegetables a day, include whole grains    & fiber and avoid regularly eating high fat or \"junk\" foods?  Yes    Taking medications regularly:  Yes    Barriers to taking medications:  None    Medication side effects:  None    Ability to successfully perform activities of daily living:  No assistance needed    Home Safety:  No safety concerns identified    Hearing Impairment:  No hearing concerns    In the past 6 months, have you been bothered by leaking of urine?  No    In general, how would you rate your overall mental or emotional health?  Excellent      PHQ-2 Total Score: 0    Additional concerns today:  No    Doing well,     Lives in [facility].  Single lives with his parents takes care of them.  Retired from [employer].     Hand is better after dupuytrens surgery.    Continues to walk to get mail and things. No leg pains.       Have you ever done Advance Care Planning? (For example, a Health Directive, POLST, or a discussion with a medical provider or your loved ones about your wishes): Yes, patient states has an Advance Care Planning document and will bring a copy to the clinic.    Fall risk  Fallen 2 or more times in the past year?: No  Any fall with injury in the past year?: No    Cognitive Screening   1) Repeat 3 items (Leader, Season, Table)    2) Clock draw: NORMAL  3) 3 item recall: Recalls 2 objects   Results: NORMAL clock, 1-2 items recalled: COGNITIVE IMPAIRMENT LESS LIKELY    Mini-CogTM Copyright S Nissa. " Conjuntivae and eyelids appear normal, Sclerae : White without injection Licensed by the author for use in [facility]; reprinted with permission (janessa@[email or web address]). All rights reserved.      Do you have sleep apnea, excessive snoring or daytime drowsiness?: no    Reviewed and updated as needed this visit by clinical staff    Allergies  Meds              Reviewed and updated as needed this visit by Provider                 Social History     Tobacco Use     Smoking status: Some Days     Packs/day: 0.50     Years: 50.00     Pack years: 25.00     Types: Cigarettes     Smokeless tobacco: Never   Substance Use Topics     Alcohol use: Yes     Comment: 2 cases per week      Quit for 7 years but restarted when his brother . Wants to quit again. Used Chantix has some at home.     4-5 beers a day, denies withdrawal  History of 2 DUIs 20 years ago was drinking heavier.     Alcohol Use 2020   Prescreen: >3 drinks/day or >7 drinks/week? No     Current providers sharing in care for this patient include:   Patient Care Team:  Hossein Tierney MD as PCP - General (Family Practice)  Shubham Eng MD as Assigned Musculoskeletal Provider  Hossein Tierney MD as Assigned PCP  Teresa Major RN as Lead Care Coordinator (Primary Care - CC)    The following health maintenance items are reviewed in Epic and correct as of today:  Health Maintenance   Topic Date Due     Pneumococcal Vaccine: 65+ Years (1 - PCV) Never done     HIV SCREENING  Never done     HEPATITIS C SCREENING  Never done     DTAP/TDAP/TD IMMUNIZATION (1 - Tdap) 2006     ZOSTER IMMUNIZATION (1 of 2) Never done     LUNG CANCER SCREENING  2021     ANNUAL REVIEW OF HM ORDERS  2022     NICOTINE/TOBACCO CESSATION COUNSELING Q 1 YR  10/22/2022     MEDICARE ANNUAL WELLNESS VISIT  2022     AORTIC ANEURYSM SCREENING (SYSTEM ASSIGNED)  Never done     COLORECTAL CANCER SCREENING  10/02/2023     FALL RISK ASSESSMENT  2024     ADVANCE CARE PLANNING  2025     LIPID  10/22/2026     PHQ-2  "(once per calendar year)  Completed     INFLUENZA VACCINE  Completed     COVID-19 Vaccine  Completed     IPV IMMUNIZATION  Aged Out     MENINGITIS IMMUNIZATION  Aged Out     Lab work is in process  Pneumonia Vaccine:will do pneumo 20 today.     Review of Systems  CONSTITUTIONAL: NEGATIVE for fever, chills, change in weight  INTEGUMENTARY/SKIN: NEGATIVE for worrisome rashes, moles or lesions  EYES: NEGATIVE for vision changes or irritation  ENT/MOUTH: NEGATIVE for ear, mouth and throat problems  RESP: NEGATIVE for significant cough or SOB  CV: NEGATIVE for chest pain, palpitations or peripheral edema  GI: NEGATIVE for nausea, abdominal pain, heartburn, or change in bowel habits  : NEGATIVE for frequency, dysuria, or hematuria  MUSCULOSKELETAL:night cramps.   NEURO: NEGATIVE for weakness, dizziness or paresthesias  ENDOCRINE: NEGATIVE for temperature intolerance, skin/hair changes  HEME: NEGATIVE for bleeding problems  PSYCHIATRIC: NEGATIVE for changes in mood or affect    OBJECTIVE:   /84   Pulse 65   Temp 97  F (36.1  C) (Temporal)   Resp 16   Ht 1.765 m (5' 9.5\")   Wt 72.6 kg (160 lb)   SpO2 98%   BMI 23.29 kg/m   Estimated body mass index is 23.29 kg/m  as calculated from the following:    Height as of this encounter: 1.765 m (5' 9.5\").    Weight as of this encounter: 72.6 kg (160 lb).  Physical Exam  GENERAL: healthy, alert and no distress  EYES: left eye is blind, no movement  HENT: ear canals and TM's normal, nose and mouth without ulcers or lesions  NECK: no adenopathy, no asymmetry, masses, or scars and thyroid normal to palpation  RESP: lungs clear to auscultation - no rales, rhonchi or wheezes  CV: regular rate and rhythm, normal S1 S2, no S3 or S4, no murmur, click or rub, no peripheral edema and peripheral pulses strong  ABDOMEN: soft, nontender, no hepatosplenomegaly, no masses and bowel sounds normal  MS: no gross musculoskeletal defects noted, no edema  SKIN: no suspicious lesions or " rashes  NEURO: Normal strength and tone, mentation intact and speech normal  PSYCH: mentation appears normal, affect normal/bright    ASSESSMENT / PLAN:       ICD-10-CM    1. Medicare annual wellness visit, initial  Z00.00       2. Hyperlipidemia LDL goal <130  E78.5 pravastatin (PRAVACHOL) 40 MG tablet     Comprehensive metabolic panel (BMP + Alb, Alk Phos, ALT, AST, Total. Bili, TP)     Lipid panel reflex to direct LDL Fasting      3. Benign essential hypertension  I10 lisinopril (ZESTRIL) 20 MG tablet     metoprolol succinate ER (TOPROL XL) 200 MG 24 hr tablet     Albumin Random Urine Quantitative with Creat Ratio      4. Gastroesophageal reflux disease without esophagitis  K21.9 omeprazole (PRILOSEC) 40 MG DR capsule      5. Screening for prostate cancer  Z12.5 PSA, screen      6. Tobacco abuse disorder  Z72.0 CT Chest Lung Cancer Scrn Low Dose wo      7. Blindness of left eye, unspecified right eye visual impairment category  H54.40          Patient here for a wellness check, doing ok, lives with and cares for his parents.    Smoking and will use chantix again to quit. Will do a chest ct screening annually    Pneumonia shot today.    Looking for his colonoscopy report from 2-3 years ago, he said he had done.     Will refill medications for lipids, htn and check labs.         Discussed alcohol, no drinking and driving, but should cut back some.                       Patient has been advised of split billing requirements and indicates understanding: Yes      COUNSELING:  Reviewed preventive health counseling, as reflected in patient instructions       Regular exercise       Healthy diet/nutrition       Immunizations    Vaccinated for: Pneumococcal        He reports that he has been smoking cigarettes. He has a 25.00 pack-year smoking history. He has never used smokeless tobacco.  Nicotine/Tobacco Cessation Plan:   Pharmacotherapies : varenicline (Chantix)      Appropriate preventive services were discussed with  this patient, including applicable screening as appropriate for cardiovascular disease, diabetes, osteopenia/osteoporosis, and glaucoma.  As appropriate for age/gender, discussed screening for colorectal cancer, prostate cancer, breast cancer, and cervical cancer. Checklist reviewing preventive services available has been given to the patient.    Reviewed patients plan of care and provided an AVS. The Basic Care Plan (routine screening as documented in Health Maintenance) for Isabel meets the Care Plan requirement. This Care Plan has been established and reviewed with the Patient.      Josias Ch MD  [facility]    Identified Health Risks:

## 2023-04-12 ENCOUNTER — OFFICE VISIT (OUTPATIENT)
Dept: URBAN - METROPOLITAN AREA SURGERY CENTER 14 | Facility: SURGERY CENTER | Age: 58
End: 2023-04-12

## 2023-05-12 ENCOUNTER — OFFICE VISIT (OUTPATIENT)
Dept: URBAN - METROPOLITAN AREA SURGERY CENTER 14 | Facility: SURGERY CENTER | Age: 58
End: 2023-05-12

## 2023-05-12 ENCOUNTER — CLAIMS CREATED FROM THE CLAIM WINDOW (OUTPATIENT)
Dept: URBAN - METROPOLITAN AREA CLINIC 4 | Facility: CLINIC | Age: 58
End: 2023-05-12
Payer: MEDICARE

## 2023-05-12 ENCOUNTER — CLAIMS CREATED FROM THE CLAIM WINDOW (OUTPATIENT)
Dept: URBAN - METROPOLITAN AREA SURGERY CENTER 14 | Facility: SURGERY CENTER | Age: 58
End: 2023-05-12
Payer: MEDICARE

## 2023-05-12 DIAGNOSIS — K25.7 CHRONIC GASTRIC ULCER WITHOUT HEMORRHAGE OR PERFORATION: ICD-10-CM

## 2023-05-12 DIAGNOSIS — K22.2 ESOPHAGEAL STENOSIS: ICD-10-CM

## 2023-05-12 DIAGNOSIS — R13.19 DYSPHAGIA: ICD-10-CM

## 2023-05-12 DIAGNOSIS — K31.89 OTHER DISEASES OF STOMACH AND DUODENUM: ICD-10-CM

## 2023-05-12 DIAGNOSIS — K25.9 GASTRIC ULCER: ICD-10-CM

## 2023-05-12 DIAGNOSIS — K21.9 GASTRO-ESOPHAGEAL REFLUX: ICD-10-CM

## 2023-05-12 PROCEDURE — 88312 SPECIAL STAINS GROUP 1: CPT | Performed by: PATHOLOGY

## 2023-05-12 PROCEDURE — 43249 ESOPH EGD DILATION <30 MM: CPT | Performed by: INTERNAL MEDICINE

## 2023-05-12 PROCEDURE — 88305 TISSUE EXAM BY PATHOLOGIST: CPT | Performed by: PATHOLOGY

## 2023-05-12 PROCEDURE — G8907 PT DOC NO EVENTS ON DISCHARG: HCPCS | Performed by: INTERNAL MEDICINE

## 2023-05-12 PROCEDURE — 88342 IMHCHEM/IMCYTCHM 1ST ANTB: CPT | Performed by: PATHOLOGY

## 2023-05-12 PROCEDURE — 43239 EGD BIOPSY SINGLE/MULTIPLE: CPT | Performed by: INTERNAL MEDICINE

## 2024-06-13 ENCOUNTER — OFFICE VISIT (OUTPATIENT)
Dept: URBAN - NONMETROPOLITAN AREA CLINIC 4 | Facility: CLINIC | Age: 59
End: 2024-06-13
Payer: MEDICARE

## 2024-06-13 VITALS
WEIGHT: 169.8 LBS | TEMPERATURE: 98.2 F | SYSTOLIC BLOOD PRESSURE: 122 MMHG | HEART RATE: 70 BPM | HEIGHT: 63 IN | DIASTOLIC BLOOD PRESSURE: 80 MMHG | BODY MASS INDEX: 30.09 KG/M2

## 2024-06-13 DIAGNOSIS — Z12.11 COLON CANCER SCREENING: ICD-10-CM

## 2024-06-13 PROCEDURE — 99212 OFFICE O/P EST SF 10 MIN: CPT | Performed by: INTERNAL MEDICINE

## 2024-06-13 RX ORDER — FLUTICASONE PROPIONATE 50 UG/1
1 SPRAY IN EACH NOSTRIL SPRAY, METERED NASAL ONCE A DAY
Status: ACTIVE | COMMUNITY

## 2024-06-13 RX ORDER — PREGABALIN 150 MG/1
1 CAPSULE CAPSULE ORAL TWICE A DAY
Refills: 0 | Status: ACTIVE | COMMUNITY
Start: 1900-01-01

## 2024-06-13 RX ORDER — CLONIDINE HYDROCHLORIDE 0.2 MG/1
AS DIRECTED TABLET ORAL
Refills: 0 | Status: ACTIVE | COMMUNITY
Start: 1900-01-01

## 2024-06-13 RX ORDER — LINACLOTIDE 290 UG/1
TAKE ONE CAPSULE BY MOUTH ONCE DAILY CAPSULE, GELATIN COATED ORAL
Qty: 90 | Refills: 2 | Status: ACTIVE | COMMUNITY

## 2024-06-13 RX ORDER — TIZANIDINE 4 MG/1
1 TABLET AS NEEDED TABLET ORAL THREE TIMES A DAY
Status: ACTIVE | COMMUNITY

## 2024-06-13 RX ORDER — ATORVASTATIN CALCIUM 80 MG/1
TABLET, FILM COATED ORAL
Qty: 0 | Refills: 0 | Status: ACTIVE | COMMUNITY
Start: 1900-01-01

## 2024-06-13 RX ORDER — AMLODIPINE BESYLATE 10 MG/1
TAKE 1 TABLET (10 MG) BY ORAL ROUTE ONCE DAILY TABLET ORAL 1
Qty: 0 | Refills: 0 | Status: ACTIVE | COMMUNITY
Start: 1900-01-01

## 2024-06-13 RX ORDER — LORATADINE 10 MG
1 TABLET TABLET ORAL ONCE A DAY
Status: ACTIVE | COMMUNITY

## 2024-06-13 RX ORDER — HYDROCODONE BITARTRATE AND ACETAMINOPHEN 5; 325 MG/1; MG/1
1 TABLET AS NEEDED TABLET ORAL
Status: ACTIVE | COMMUNITY

## 2024-06-13 RX ORDER — TOPIRAMATE 25 MG/1
1 TABLET TABLET, FILM COATED ORAL TWICE A DAY
Refills: 0 | Status: ACTIVE | COMMUNITY
Start: 1900-01-01

## 2024-06-13 RX ORDER — OMEPRAZOLE 20 MG/1
1 CAPSULE 30 MINUTES BEFORE MORNING MEAL CAPSULE, DELAYED RELEASE ORAL ONCE A DAY
Status: ACTIVE | COMMUNITY

## 2024-06-13 RX ORDER — FOLIC ACID 1 MG/1
TAKE 1 TABLET (1 MG) BY ORAL ROUTE ONCE DAILY TABLET ORAL 1
Qty: 0 | Refills: 0 | Status: ACTIVE | COMMUNITY
Start: 1900-01-01

## 2024-06-13 RX ORDER — CHOLECALCIFEROL (VITAMIN D3) 1250 MCG
1 CAPSULE CAPSULE ORAL
Status: ACTIVE | COMMUNITY

## 2024-06-13 RX ORDER — RIMEGEPANT SULFATE 75 MG/75MG
1 TABLET ON THE TONGUE AND ALLOW TO DISSOLVE TABLET, ORALLY DISINTEGRATING ORAL
Status: ACTIVE | COMMUNITY

## 2024-06-13 RX ORDER — METHOTREXATE SODIUM 2.5 MG/1
AS DIRECTED TABLET ORAL
Refills: 0 | Status: ACTIVE | COMMUNITY
Start: 1900-01-01

## 2024-06-13 RX ORDER — DICLOFENAC SODIUM 10 MG/G
AS DIRECTED GEL TOPICAL
Status: ACTIVE | COMMUNITY

## 2024-06-13 NOTE — HPI-OTHER HISTORIES
01/2022 Patient reports that she was out of refills for her linzess.  Pt reports that she has good response to Linzess.   Pt reports that she has otherwise been in her usual state of health. Has to do a stress test on next Friday.   Pt report that she has had surgery to get the scar tissue out of eyes. Pt reports that she has had improvement in her anemia 11/2020 Here to review her C scope results. Had C scope with random colon bx with neg bx for microscopic colitis. Pt is currently on the FODMAP diet.  Pt reports that she had been advised that she has Sjogrens syndrome. Pt reports that she was advised that this could contribute to symptoms. Pt with hx of ankylosing spondylitis.  Pt reports that she was started on Humira and has been on injections every other week for this.  Pt reports that she is having approx q3 days a bowel movement to every day.  Pt with microcytic anemia mcv of 78 with hgb of 12  3-30-23 Pt with diarrhea intermittently unable to correlate with what she eats.  Pt reports that she just allows it to happen with no intervention.   Pt reports that she dno longer has iron def anemia.   Pt reports that she has a hematologist which has been following. Had when she was younger had sickle cell trait.  Pt reports that her Sjogrens, has arthritis.   Takes no medication.  6-13-24 Pt reports that she is here for crc screening.  Pt reports that she went to [facility] when her daughter got . Not due for C scope until  Pt reports that she has ssome tendereness

## 2024-06-13 NOTE — HPI-TODAY'S VISIT:
Here for hospital follow-up at [facility] seen in the ER no admission.  Patient seen at that time for reportedly chest pain, abdominal pain, and diarrhea.  Chest pain work-up noted be negative.  No abdominal imaging or stool studies performed.  Patient here today with multiple complaints including upper abdominal pain more epigastric and left upper quadrant as well as occasional nausea and vomiting that she feels may be related to her gastroparesis.  Patient with intermittent diarrhea over the last 2 years.  Patient with stool studies and EGD and colonoscopy without abnormalities noted.  Does not wish to take over-the-counter antidiarrheal medications. Also complains of dysphagia to solids and liquids.  Patient with nonobstructing Schatzki's ring on last EGD In 2020.  History of Sjogren's.

## 2024-10-21 ENCOUNTER — TELEPHONE ENCOUNTER (OUTPATIENT)
Dept: URBAN - NONMETROPOLITAN AREA CLINIC 4 | Facility: CLINIC | Age: 59
End: 2024-10-21

## 2024-11-01 ENCOUNTER — OFFICE VISIT (OUTPATIENT)
Dept: URBAN - NONMETROPOLITAN AREA CLINIC 4 | Facility: CLINIC | Age: 59
End: 2024-11-01
Payer: MEDICARE

## 2024-11-01 VITALS
SYSTOLIC BLOOD PRESSURE: 108 MMHG | HEART RATE: 76 BPM | TEMPERATURE: 97.8 F | HEIGHT: 63 IN | WEIGHT: 168 LBS | BODY MASS INDEX: 29.77 KG/M2 | DIASTOLIC BLOOD PRESSURE: 76 MMHG

## 2024-11-01 DIAGNOSIS — K58.0 IRRITABLE BOWEL SYNDROME WITH DIARRHEA: ICD-10-CM

## 2024-11-01 DIAGNOSIS — M35.08 SJOGREN SYNDROME WITH GASTROINTESTINAL INVOLVEMENT: ICD-10-CM

## 2024-11-01 PROBLEM — 197125005: Status: ACTIVE | Noted: 2024-11-01

## 2024-11-01 PROCEDURE — 99214 OFFICE O/P EST MOD 30 MIN: CPT | Performed by: REGISTERED NURSE

## 2024-11-01 RX ORDER — PREGABALIN 150 MG/1
1 CAPSULE CAPSULE ORAL TWICE A DAY
Refills: 0 | Status: ACTIVE | COMMUNITY
Start: 1900-01-01

## 2024-11-01 RX ORDER — LINACLOTIDE 290 UG/1
TAKE ONE CAPSULE BY MOUTH ONCE DAILY CAPSULE, GELATIN COATED ORAL
Qty: 90 | Refills: 2 | Status: ON HOLD | COMMUNITY

## 2024-11-01 RX ORDER — FLUTICASONE PROPIONATE 50 UG/1
1 SPRAY IN EACH NOSTRIL SPRAY, METERED NASAL ONCE A DAY
Status: ACTIVE | COMMUNITY

## 2024-11-01 RX ORDER — RIMEGEPANT SULFATE 75 MG/75MG
1 TABLET ON THE TONGUE AND ALLOW TO DISSOLVE TABLET, ORALLY DISINTEGRATING ORAL
Status: ACTIVE | COMMUNITY

## 2024-11-01 RX ORDER — TIZANIDINE 4 MG/1
1 TABLET AS NEEDED TABLET ORAL THREE TIMES A DAY
Status: ACTIVE | COMMUNITY

## 2024-11-01 RX ORDER — OMEPRAZOLE 20 MG/1
1 CAPSULE 30 MINUTES BEFORE MORNING MEAL CAPSULE, DELAYED RELEASE ORAL ONCE A DAY
Status: ACTIVE | COMMUNITY

## 2024-11-01 RX ORDER — TOPIRAMATE 25 MG/1
1 TABLET TABLET, FILM COATED ORAL TWICE A DAY
Refills: 0 | Status: ACTIVE | COMMUNITY
Start: 1900-01-01

## 2024-11-01 RX ORDER — CLONIDINE HYDROCHLORIDE 0.2 MG/1
AS DIRECTED TABLET ORAL
Refills: 0 | Status: ACTIVE | COMMUNITY
Start: 1900-01-01

## 2024-11-01 RX ORDER — FOLIC ACID 1 MG/1
TAKE 1 TABLET (1 MG) BY ORAL ROUTE ONCE DAILY TABLET ORAL 1
Qty: 0 | Refills: 0 | Status: ACTIVE | COMMUNITY
Start: 1900-01-01

## 2024-11-01 RX ORDER — METHOTREXATE SODIUM 2.5 MG/1
AS DIRECTED TABLET ORAL
Refills: 0 | Status: ACTIVE | COMMUNITY
Start: 1900-01-01

## 2024-11-01 RX ORDER — CHOLECALCIFEROL (VITAMIN D3) 1250 MCG
1 CAPSULE CAPSULE ORAL
Status: ACTIVE | COMMUNITY

## 2024-11-01 RX ORDER — AMLODIPINE BESYLATE 10 MG/1
TAKE 1 TABLET (10 MG) BY ORAL ROUTE ONCE DAILY TABLET ORAL 1
Qty: 0 | Refills: 0 | Status: ACTIVE | COMMUNITY
Start: 1900-01-01

## 2024-11-01 RX ORDER — HYDROCODONE BITARTRATE AND ACETAMINOPHEN 5; 325 MG/1; MG/1
1 TABLET AS NEEDED TABLET ORAL
Status: ACTIVE | COMMUNITY

## 2024-11-01 RX ORDER — DICYCLOMINE HYDROCHLORIDE 20 MG/1
1 TABLET TABLET ORAL THREE TIMES A DAY
Qty: 90 | Refills: 1 | OUTPATIENT
Start: 2024-11-01 | End: 2024-12-30

## 2024-11-01 RX ORDER — LORATADINE 10 MG
1 TABLET TABLET ORAL ONCE A DAY
Status: ACTIVE | COMMUNITY

## 2024-11-01 RX ORDER — ATORVASTATIN CALCIUM 80 MG/1
TABLET, FILM COATED ORAL
Qty: 0 | Refills: 0 | Status: ACTIVE | COMMUNITY
Start: 1900-01-01

## 2024-11-01 RX ORDER — DICLOFENAC SODIUM 10 MG/G
AS DIRECTED GEL TOPICAL
Status: ACTIVE | COMMUNITY

## 2024-11-01 NOTE — HPI-OTHER HISTORIES
01/2022 Patient reports that she was out of refills for her linzess.  Pt reports that she has good response to Linzess.   Pt reports that she has otherwise been in her usual state of health. Has to do a stress test on next Friday.   Pt report that she has had surgery to get the scar tissue out of eyes. Pt reports that she has had improvement in her anemia 11/2020 Here to review her C scope results. Had C scope with random colon bx with neg bx for microscopic colitis. Pt is currently on the FODMAP diet.  Pt reports that she had been advised that she has Sjogrens syndrome. Pt reports that she was advised that this could contribute to symptoms. Pt with hx of ankylosing spondylitis.  Pt reports that she was started on Humira and has been on injections every other week for this.  Pt reports that she is having approx q3 days a bowel movement to every day.  Pt with microcytic anemia mcv of 78 with hgb of 12  3-30-23 Pt with diarrhea intermittently unable to correlate with what she eats.  Pt reports that she just allows it to happen with no intervention.   Pt reports that she dno longer has iron def anemia.   Pt reports that she has a hematologist which has been following. Had when she was younger had sickle cell trait.  Pt reports that her Sjogrens, has arthritis.   Takes no medication.  6-13-24 Pt reports that she is here for crc screening.  Pt reports that she went to [facility] when her daughter got . Not due for C scope until  Pt reports that she has ssome tendereness  11/1/24: Pt RTC with c/o chronic intermittent postprandial diarrhea. Has associated left sided abdominal pain during BMs. She also has bloating. States symptoms have been worse recently. She has not been able to correlate any specific foods. Denies hematochezia, fever, chills or unintentional weight loss. She used to take Bentyl, but not currently. She had a cscope in 2020 that revealed hyperplastic colon polyps in sigmoid colon, otherwise normal. Random colon bx neg for MC.

## 2024-12-03 ENCOUNTER — OFFICE VISIT (OUTPATIENT)
Dept: URBAN - NONMETROPOLITAN AREA CLINIC 4 | Facility: CLINIC | Age: 59
End: 2024-12-03
Payer: MEDICARE

## 2024-12-03 VITALS
TEMPERATURE: 98.2 F | HEART RATE: 80 BPM | HEIGHT: 63 IN | SYSTOLIC BLOOD PRESSURE: 131 MMHG | BODY MASS INDEX: 31.18 KG/M2 | DIASTOLIC BLOOD PRESSURE: 84 MMHG | WEIGHT: 176 LBS

## 2024-12-03 DIAGNOSIS — K58.2 IRRITABLE BOWEL SYNDROME WITH BOTH CONSTIPATION AND DIARRHEA: ICD-10-CM

## 2024-12-03 DIAGNOSIS — M35.08 SJOGREN SYNDROME WITH GASTROINTESTINAL INVOLVEMENT: ICD-10-CM

## 2024-12-03 PROBLEM — 10743008: Status: ACTIVE | Noted: 2024-12-03

## 2024-12-03 PROCEDURE — 99213 OFFICE O/P EST LOW 20 MIN: CPT | Performed by: REGISTERED NURSE

## 2024-12-03 RX ORDER — CLONIDINE HYDROCHLORIDE 0.2 MG/1
AS DIRECTED TABLET ORAL
Refills: 0 | Status: ACTIVE | COMMUNITY
Start: 1900-01-01

## 2024-12-03 RX ORDER — LINACLOTIDE 290 UG/1
TAKE ONE CAPSULE BY MOUTH ONCE DAILY CAPSULE, GELATIN COATED ORAL
Qty: 90 | Refills: 3

## 2024-12-03 RX ORDER — AMLODIPINE BESYLATE 10 MG/1
TAKE 1 TABLET (10 MG) BY ORAL ROUTE ONCE DAILY TABLET ORAL 1
Qty: 0 | Refills: 0 | Status: ACTIVE | COMMUNITY
Start: 1900-01-01

## 2024-12-03 RX ORDER — DICYCLOMINE HYDROCHLORIDE 20 MG/1
1 TABLET TABLET ORAL THREE TIMES A DAY
Qty: 90 | Refills: 1 | Status: ACTIVE | COMMUNITY
Start: 2024-11-01 | End: 2024-12-30

## 2024-12-03 RX ORDER — FOLIC ACID 1 MG/1
TAKE 1 TABLET (1 MG) BY ORAL ROUTE ONCE DAILY TABLET ORAL 1
Qty: 0 | Refills: 0 | Status: ACTIVE | COMMUNITY
Start: 1900-01-01

## 2024-12-03 RX ORDER — METHOTREXATE SODIUM 2.5 MG/1
AS DIRECTED TABLET ORAL
Refills: 0 | Status: ACTIVE | COMMUNITY
Start: 1900-01-01

## 2024-12-03 RX ORDER — FLUTICASONE PROPIONATE 50 UG/1
1 SPRAY IN EACH NOSTRIL SPRAY, METERED NASAL ONCE A DAY
Status: ACTIVE | COMMUNITY

## 2024-12-03 RX ORDER — LORATADINE 10 MG
1 TABLET TABLET ORAL ONCE A DAY
Status: ACTIVE | COMMUNITY

## 2024-12-03 RX ORDER — OMEPRAZOLE 20 MG/1
1 CAPSULE 30 MINUTES BEFORE MORNING MEAL CAPSULE, DELAYED RELEASE ORAL ONCE A DAY
Status: ACTIVE | COMMUNITY

## 2024-12-03 RX ORDER — RIMEGEPANT SULFATE 75 MG/75MG
1 TABLET ON THE TONGUE AND ALLOW TO DISSOLVE TABLET, ORALLY DISINTEGRATING ORAL
Status: ACTIVE | COMMUNITY

## 2024-12-03 RX ORDER — HYDROCODONE BITARTRATE AND ACETAMINOPHEN 5; 325 MG/1; MG/1
1 TABLET AS NEEDED TABLET ORAL
Status: ACTIVE | COMMUNITY

## 2024-12-03 RX ORDER — CHOLECALCIFEROL (VITAMIN D3) 1250 MCG
1 CAPSULE CAPSULE ORAL
Status: ACTIVE | COMMUNITY

## 2024-12-03 RX ORDER — TOPIRAMATE 25 MG/1
1 TABLET TABLET, FILM COATED ORAL TWICE A DAY
Refills: 0 | Status: ACTIVE | COMMUNITY
Start: 1900-01-01

## 2024-12-03 RX ORDER — ATORVASTATIN CALCIUM 80 MG/1
TABLET, FILM COATED ORAL
Qty: 0 | Refills: 0 | Status: ACTIVE | COMMUNITY
Start: 1900-01-01

## 2024-12-03 RX ORDER — TIZANIDINE 4 MG/1
1 TABLET AS NEEDED TABLET ORAL THREE TIMES A DAY
Status: ACTIVE | COMMUNITY

## 2024-12-03 RX ORDER — PREGABALIN 150 MG/1
1 CAPSULE CAPSULE ORAL TWICE A DAY
Refills: 0 | Status: ACTIVE | COMMUNITY
Start: 1900-01-01

## 2024-12-03 RX ORDER — LINACLOTIDE 290 UG/1
TAKE ONE CAPSULE BY MOUTH ONCE DAILY CAPSULE, GELATIN COATED ORAL
Qty: 90 | Refills: 2 | Status: ON HOLD | COMMUNITY

## 2024-12-03 RX ORDER — DICLOFENAC SODIUM 10 MG/G
AS DIRECTED GEL TOPICAL
Status: ACTIVE | COMMUNITY

## 2024-12-03 NOTE — HPI-OTHER HISTORIES
01/2022 Patient reports that she was out of refills for her linzess.  Pt reports that she has good response to Linzess.   Pt reports that she has otherwise been in her usual state of health. Has to do a stress test on next Friday.   Pt report that she has had surgery to get the scar tissue out of eyes. Pt reports that she has had improvement in her anemia 11/2020 Here to review her C scope results. Had C scope with random colon bx with neg bx for microscopic colitis. Pt is currently on the FODMAP diet.  Pt reports that she had been advised that she has Sjogrens syndrome. Pt reports that she was advised that this could contribute to symptoms. Pt with hx of ankylosing spondylitis.  Pt reports that she was started on Humira and has been on injections every other week for this.  Pt reports that she is having approx q3 days a bowel movement to every day.  Pt with microcytic anemia mcv of 78 with hgb of 12  3-30-23 Pt with diarrhea intermittently unable to correlate with what she eats.  Pt reports that she just allows it to happen with no intervention.   Pt reports that she dno longer has iron def anemia.   Pt reports that she has a hematologist which has been following. Had when she was younger had sickle cell trait.  Pt reports that her Sjogrens, has arthritis.   Takes no medication.  6-13-24 Pt reports that she is here for crc screening.  Pt reports that she went to [facility] when her daughter got . Not due for C scope until  Pt reports that she has ssome tendereness  11/1/24: Pt RTC with c/o chronic intermittent postprandial diarrhea. Has associated left sided abdominal pain during BMs. She also has bloating. States symptoms have been worse recently. She has not been able to correlate any specific foods. Denies hematochezia, fever, chills or unintentional weight loss. She used to take Bentyl, but not currently. She had a cscope in 2020 that revealed hyperplastic colon polyps in sigmoid colon, otherwise normal. Random colon bx neg for MC.  12/3/24: Pt RTC for f/u. She has been taking Bentyl TID before meals and denies any further abdominal pain or diarrhea. She now feels constipated. She has taken Linzess 290 mcg in the past and requesting Rx in case she needs it.

## 2025-06-12 ENCOUNTER — OFFICE VISIT (OUTPATIENT)
Dept: URBAN - NONMETROPOLITAN AREA CLINIC 4 | Facility: CLINIC | Age: 60
End: 2025-06-12

## 2025-08-05 ENCOUNTER — OFFICE VISIT (OUTPATIENT)
Dept: URBAN - NONMETROPOLITAN AREA CLINIC 4 | Facility: CLINIC | Age: 60
End: 2025-08-05
Payer: MEDICARE

## 2025-08-05 ENCOUNTER — LAB OUTSIDE AN ENCOUNTER (OUTPATIENT)
Dept: URBAN - NONMETROPOLITAN AREA CLINIC 4 | Facility: CLINIC | Age: 60
End: 2025-08-05

## 2025-08-05 DIAGNOSIS — K58.0 IRRITABLE BOWEL SYNDROME WITH DIARRHEA: ICD-10-CM

## 2025-08-05 DIAGNOSIS — K52.9 CHRONIC DIARRHEA: ICD-10-CM

## 2025-08-05 DIAGNOSIS — R07.89 OTHER CHEST PAIN: ICD-10-CM

## 2025-08-05 DIAGNOSIS — R19.7 DIARRHEA: ICD-10-CM

## 2025-08-05 PROCEDURE — 99214 OFFICE O/P EST MOD 30 MIN: CPT | Performed by: INTERNAL MEDICINE

## 2025-08-05 RX ORDER — PREGABALIN 150 MG/1
1 CAPSULE CAPSULE ORAL TWICE A DAY
Refills: 0 | Status: ACTIVE | COMMUNITY
Start: 1900-01-01

## 2025-08-05 RX ORDER — LORATADINE 10 MG
1 TABLET TABLET ORAL ONCE A DAY
Status: ACTIVE | COMMUNITY

## 2025-08-05 RX ORDER — CHOLECALCIFEROL (VITAMIN D3) 1250 MCG
1 CAPSULE CAPSULE ORAL
Status: ACTIVE | COMMUNITY

## 2025-08-05 RX ORDER — TOPIRAMATE 25 MG/1
1 TABLET TABLET, FILM COATED ORAL TWICE A DAY
Refills: 0 | Status: ACTIVE | COMMUNITY
Start: 1900-01-01

## 2025-08-05 RX ORDER — CLONIDINE HYDROCHLORIDE 0.2 MG/1
AS DIRECTED TABLET ORAL
Refills: 0 | Status: ACTIVE | COMMUNITY
Start: 1900-01-01

## 2025-08-05 RX ORDER — OMEPRAZOLE 20 MG/1
1 CAPSULE 30 MINUTES BEFORE MORNING MEAL CAPSULE, DELAYED RELEASE ORAL ONCE A DAY
Status: ACTIVE | COMMUNITY

## 2025-08-05 RX ORDER — METHOTREXATE SODIUM 2.5 MG/1
AS DIRECTED TABLET ORAL
Refills: 0 | Status: ACTIVE | COMMUNITY
Start: 1900-01-01

## 2025-08-05 RX ORDER — HYDROCODONE BITARTRATE AND ACETAMINOPHEN 5; 325 MG/1; MG/1
1 TABLET AS NEEDED TABLET ORAL
Status: ACTIVE | COMMUNITY

## 2025-08-05 RX ORDER — ATORVASTATIN CALCIUM 80 MG/1
TABLET, FILM COATED ORAL
Qty: 0 | Refills: 0 | Status: ACTIVE | COMMUNITY
Start: 1900-01-01

## 2025-08-05 RX ORDER — TIZANIDINE 4 MG/1
1 TABLET AS NEEDED TABLET ORAL THREE TIMES A DAY
Status: ACTIVE | COMMUNITY

## 2025-08-05 RX ORDER — RIMEGEPANT SULFATE 75 MG/75MG
1 TABLET ON THE TONGUE AND ALLOW TO DISSOLVE TABLET, ORALLY DISINTEGRATING ORAL
Status: ACTIVE | COMMUNITY

## 2025-08-05 RX ORDER — FOLIC ACID 1 MG/1
TAKE 1 TABLET (1 MG) BY ORAL ROUTE ONCE DAILY TABLET ORAL 1
Qty: 0 | Refills: 0 | Status: ACTIVE | COMMUNITY
Start: 1900-01-01

## 2025-08-05 RX ORDER — DICLOFENAC SODIUM 10 MG/G
AS DIRECTED GEL TOPICAL
Status: ACTIVE | COMMUNITY

## 2025-08-05 RX ORDER — LINACLOTIDE 290 UG/1
TAKE ONE CAPSULE BY MOUTH ONCE DAILY CAPSULE, GELATIN COATED ORAL
Qty: 90 | Refills: 3 | Status: ACTIVE | COMMUNITY

## 2025-08-05 RX ORDER — AMLODIPINE BESYLATE 10 MG/1
TAKE 1 TABLET (10 MG) BY ORAL ROUTE ONCE DAILY TABLET ORAL 1
Qty: 0 | Refills: 0 | Status: ACTIVE | COMMUNITY
Start: 1900-01-01

## 2025-08-05 RX ORDER — FLUTICASONE PROPIONATE 50 UG/1
1 SPRAY IN EACH NOSTRIL SPRAY, METERED NASAL ONCE A DAY
Status: ACTIVE | COMMUNITY

## 2025-08-06 LAB
(TTG) AB, IGG: <1
ANTIGLIADIN ABS, IGA: <1
GLIADIN (DEAMIDATED) AB (IGG): <1
IMMUNOGLOBULIN A: 323
T-TRANSGLUTAMINASE (TTG) IGA: <1

## 2025-08-25 LAB
CALPROTECTIN, FECAL: 22
CAMPYLOBACTER GROUP: (no result)
OVA AND PARASITES, CONC AND PERM SMEAR: (no result)
PANCREATIC ELASTASE, FECAL: >800
ROTAVIRUS AG, EIA: (no result)